# Patient Record
Sex: MALE | Employment: OTHER | URBAN - METROPOLITAN AREA
[De-identification: names, ages, dates, MRNs, and addresses within clinical notes are randomized per-mention and may not be internally consistent; named-entity substitution may affect disease eponyms.]

---

## 2024-07-30 ENCOUNTER — APPOINTMENT (OUTPATIENT)
Dept: NEUROLOGY | Facility: HOSPITAL | Age: 69
End: 2024-07-30
Payer: COMMERCIAL

## 2024-08-20 ENCOUNTER — OFFICE VISIT (OUTPATIENT)
Dept: NEUROLOGY | Facility: HOSPITAL | Age: 69
End: 2024-08-20
Payer: COMMERCIAL

## 2024-08-20 VITALS
WEIGHT: 197.31 LBS | HEART RATE: 74 BPM | SYSTOLIC BLOOD PRESSURE: 126 MMHG | TEMPERATURE: 98 F | HEIGHT: 74 IN | DIASTOLIC BLOOD PRESSURE: 80 MMHG | RESPIRATION RATE: 16 BRPM | BODY MASS INDEX: 25.32 KG/M2

## 2024-08-20 DIAGNOSIS — R41.89 COGNITIVE CHANGES: Primary | ICD-10-CM

## 2024-08-20 PROCEDURE — 1126F AMNT PAIN NOTED NONE PRSNT: CPT | Performed by: PSYCHIATRY & NEUROLOGY

## 2024-08-20 PROCEDURE — 99417 PROLNG OP E/M EACH 15 MIN: CPT | Performed by: PSYCHIATRY & NEUROLOGY

## 2024-08-20 PROCEDURE — 99215 OFFICE O/P EST HI 40 MIN: CPT | Mod: GC | Performed by: PSYCHIATRY & NEUROLOGY

## 2024-08-20 PROCEDURE — 99205 OFFICE O/P NEW HI 60 MIN: CPT | Performed by: PSYCHIATRY & NEUROLOGY

## 2024-08-20 PROCEDURE — 3008F BODY MASS INDEX DOCD: CPT | Performed by: PSYCHIATRY & NEUROLOGY

## 2024-08-20 ASSESSMENT — ENCOUNTER SYMPTOMS
DEPRESSION: 0
OCCASIONAL FEELINGS OF UNSTEADINESS: 0
LOSS OF SENSATION IN FEET: 0

## 2024-08-20 ASSESSMENT — PAIN SCALES - GENERAL: PAINLEVEL: 0-NO PAIN

## 2024-08-20 NOTE — PATIENT INSTRUCTIONS
You were seen today by Dr. Rivers.  It is a pleasure seeing you.    Given the history and today's evaluation, I see some cognitive changes which are mild.  Etiology include medication side effect, incipient neurodegenerative disorder, a multifactorial etiology can't be entirely ruled out.      PLAN:  Given the history of VF and cardiac arrest, I would like to get brain MRI wo contrast  Check vitamin B12   We discussed further evaluation with neuropsychological testing, amyloid PET and CSF testing for Alzheimer's disease biomarker.   We discussed strategies to remain physically and mentally active, and socially engaged.  Follow-up in 6 months or earlier if needed      Please call [phone] if you have any questions.     General brain health guidelines:  - make sure your other medical conditions are well controlled (e.g., high blood pressure, high cholesterol, diabetes, sleep apnea etc)  - do not smoke or chew tobacco  - address any sensory deficits (e.g., proper glasses for poor eyesight, hearing aids for hearing loss)  - use a weekly pill box  - eat a heart healthy diet (e.g., lots of fruits and vegetables, low fat and cholesterol)  - exercise at least four days per week, 30 minutes at a time at an intensity that would make it difficult to converse with someone   - make sure you are getting at least 7 hours of quality sleep per night  - keep yourself mentally active daily by reading, playing cards, doing word searches, puzzles, etc.  - stay socially active by being part of a group or organization

## 2024-08-20 NOTE — PROGRESS NOTES
Provider impressions:  Mr. Lino is a 68 -year-old RH man with 14 years of formal education and PMHx of VF c/b cardiac arrest in 2009, s/p ICD implantation, dilated cardiomyopathy, and asthma who is presenting today with chief complaint of cognitive changes. He is referred by PCP, Dr. Barton for cognitive evaluation.   He is accompanied by his wife, Aida who is healthcare POA. There are cognitive changes involving short term memory over the past year, he is independent in ADLs, neurological examination today is non-focal, he scored 25/30 on MoCA, I discussed the findings with the patient and his wife, recommended brain MRI wo contrast, per patient TSH was recently checked and came back in normal range, I put an order to check for vitamin B12 level.  I also discussed further diagnostic work-up including neuropsychological evaluation, further work-up for underlying causes including amyloid PET scan or CSF testing for Alzheimer's disease biomarker. He would like to think about them first and discuss further work-up at follow-up visit.      Given the history and today's evaluation, I see some cognitive changes which are mild.  Etiology include medication side effect as he has been taking amiodarone, incipient neurodegenerative disorder, a multifactorial etiology can't be entirely ruled out.      PLAN:  Given the history of VF and cardiac arrest, I would like to get brain MRI wo contrast  Check vitamin B12   We discussed further evaluation with neuropsychological testing, amyloid PET and CSF testing for Alzheimer's disease biomarker.   We discussed strategies to remain physically and mentally active, and socially engaged.  Follow-up in 6 months or earlier if needed        History of present illness:  Mr. Lino is a 68 -year-old RH man with 14 years of formal education and PMHx of VF c/b cardiac arrest in 2009, s/p ICD  who is presenting today with chief complaint of cognitive changes. He is referred by PCP,  "Dr. Barton for cognitive evaluation.   He is accompanied by his wife, Aida who is healthcare POA.   Aida has been noticing cognitive changes for about one year. Onset was gradual. has trouble remembering names of things, forgets scheduled events but uses a calendar.  They live in [place] and have a correction home in [facility], he works on the Triage 2 days ago.  Denies any functional impairment.     Mood is \"good.\". has not noticed any significant depressive symptoms. Gets frustrated with memory problems.   No excessive worry or anxiety.     Has good appetite - eats healthy.     No change in personality, social disinhibition, change in dietary preferences, loss of empathy, stereotyped or repetitive behaviors.     No visual hallucinations, fluctuating cognition, tremors, REM sleep behavior disorder, falls. She reports he snores at night.    Functional changes:   Born and raised in [place], [place]  Sleep: goes to bed at 1030 pm and wakes up at 7 am   Current living situation - lives in a one-story house.   Driving - Independent.   Finances - Independent.   Cooking - wife cooks.   ADLS - independent.   Medications - Takes own medication; uses pillbox.   Weapons at home: no  Knows 911? yes      Neuropsychiatric symptoms:   Depression - denies depression. Appetite ok. Sleeping fine. No worthlessness/helplessness. No suicidal thoughts, intent or plans.   Anxiety - Denies.   Psychosis - Denies.   Cherie - Denies.   Suicidality - Denies.     Prior Work-up:   Normal TSH, Vit B12.   Neuropsychological testing    Past Neuropsychiatric History:  Handedness: right.   History of traumatic brain injury: None.   History of seizures: None  History of stroke: None.   Past psychiatric history: None    PMH/PSH:  As above, in addition    Meds: reviewed as listed    Allergies: reviewed as listed      Family history:   Psychiatric: None.   Dementia: None   Parkinsons disease: None  Huntingtons disease: None  ALS: None    Social " "History:   Tobacco use, occasional alcohol, no recreational drugs  . Has  3 children   Worked in sales, retired in 2018      Mental Status Examination:  General appearance: Well-groomed, good eye contact, cooperative  Orientation: Alert and oriented to person, place, and time  Motor: no psychomotor agitation or retardation, stable gait  Speech: regular rate, rhythm, tone, and volume  Mood: \"good\"  Affect: stable, full range, with brightening, and mood congruent  Passive death wish: denies  Suicidal ideation: denies  Thought process: logical, linear, and goal-directed without loosening of associations  Thought content: no hallucinations, delusions, and not responding to internal stimuli  Insight/Judgment: good/good  Praxis: Transitive/Intransitive/Orofacial  Fund of knowledge: good  Recent and remote memory: good  Attention span and concentration: good  Language: normal receptive and expressive language without paraphrasic errors    MoCA- [place] Cognitive Assessment ( 08/20/2024 ) : 25/30   Visuospatial / Executive: 5/5  Naming: 3/3  Read List of Digits: 1/2  Read List of Letters: 1/1  Serial Sevens: 3/3   Language Repeat: 1/2   Language Fluency: 0/1   Abstraction: 2/2   Delayed Recall: 3/5   Orientation: 6/6  Education:  14 years    \"f\"= [9], \"animals\"= [17]  Memory Index Score (MIS): 10/15  No agraphia or alexia  Completed 3-step Luria task  Negative applause sign  Head turning sign: no     NEUROLOGICAL EXAMINATION    Opthalmoscopic:   Normal.  Fundi were well visualized with normal disc margins, clear vessels, and vascular pulsations, No disc edema.  The cup/disk ratio was not enlarged.  No hemorrhages or exudates were present in the posterior segments that were visualized.    Cranial nerves:  CN II: visual fields full to confrontation  CN III, IV, VI: Pupils round, reactive to light and accommodation.  Lids symmetric.  No ptosis.  Extra-occular muscles are intact with normal alignment.  No nystagmus  CN " V: Facial sensation intact bilaterally.    CN VII: Normal and symmetric facial strength.  Nasolabial folds symmetric  CN VIII: Hearing intact to finger rub  CN IX: Palate elevates symmetrically.  CN XI: Normal shoulder shrug and neck turning  CN XII: Tongue midline, with normal bulk and strength, no fasciculations        Motor:  Muscle bulk was normal in all extremities.  5/5 strength in all extremities  Normal finger taps and hand opening  Normal tone  No abnormal movements    Reflexes:   Right UE     LEFT UE  BR: 2          BR: 2  Biceps: 2    Biceps: 2  Triceps: 2   Triceps: 2    RIGHT LE     LEFT LE  Knee: 2        Knee: 2  Ankle: 2       Ankle: 2    Negative Lucrecia's reflex  No frontal release signs    Coordination:  Normal finger to nose testing and rapid alternating movements    Gait:  Able to stand from seated position with arms across chest  Stable gait    Sensory:  Negative Romberg's sign         ROS: All systems reviewed, pertinent positives noted in HPI

## 2024-10-02 DIAGNOSIS — R41.89 COGNITIVE CHANGES: Primary | ICD-10-CM

## 2024-10-23 NOTE — PROGRESS NOTES
Subjective   Patient ID: John Lino is a 69 y.o. male who presents for No chief complaint on file..  HPI  PT PRESENTS FOR A F/U ON HIS PSA AND TO CHECK HIS PROSTATE.  Overall the patient feels well denying any significant new pain and/or discomfort on  Are you experiencing:  Burning on urination -- NO  Pain on urination  -- NO  Urinary frequency -- NO  Urinary urgency -- NO  Urge incontinence --NO  Urinary stress incontinence  -- NO  Number of pads used per day --NONE  Eneuresis -- NO  Nocturia-- 1-2 X ON AVG  Hematuria --NO  Hesitancy -- NO  Post void fullness -- NO    Review of Systems  General-- No C/O fever or chills  Head-- No C/O Dizziness  Eyes-- NO  C/O blurry or double vision  Ears-- No C/O hearing loss  Neck-- Supple  Chest-- No C/O pain or discomfort  Lungs-- PT  C/O shortness of breath  Abdomen-- No C/O  pain or discomfort, No nausea or vomiting  Back-- No C/O back pain or discomfort  Extremities-- No C/O swelling or pain    Objective   Physical Exam    General-- well developed, well nourished in NAD  Head-- normal cephalic, atraumatic  Eyes-- PERRL, EOM'S FROM,  no  jaundice  Neck-- Supple, without masses  Chest-- Normal bony structure  Abdomen-- soft, non tender, liver spleen not palpable . No supra pubic masses  Back-- no flank masses palpable, no CVA tenderness on palpation or perc;ussion  Lymph nodes-- No inguinal lymphadenopathy noted  Prostate-- 2+, firm, smooth, non tender,without nodules  Testis-- both down, non tender, without masses  Epididymis-- no masses palpable  Scrotum -- no hydrocele noted  Extremities -- Normal muscle mass and tone for the patients age  Neurological-- oriented times three    Urinalysis dipstick--within normal limits    Assessment/Plan   A:  NORMAL FEELING PROSTATE  NO FAMILY H/O PROSTATE CANCER  REMOTE H/O AN ELEVATED PSA  WILL NEED TO CHECK  WITH DR MIGUEL RE: THE TYPE OF ANTIBIOTICS TO USE IF A BX IS DONE  P:  OBTAIN ALL OLD RECORDS FROM [facility]   PSA NOW-- PT  TO CALL FOR THE RESULTS  FURTHER REC ERIKA Lopez MD 10/23/24 9:33 AM

## 2024-10-24 ENCOUNTER — APPOINTMENT (OUTPATIENT)
Dept: UROLOGY | Facility: CLINIC | Age: 69
End: 2024-10-24
Payer: COMMERCIAL

## 2024-10-24 VITALS
HEIGHT: 74 IN | BODY MASS INDEX: 25.67 KG/M2 | WEIGHT: 200 LBS | SYSTOLIC BLOOD PRESSURE: 100 MMHG | HEART RATE: 80 BPM | DIASTOLIC BLOOD PRESSURE: 66 MMHG

## 2024-10-24 DIAGNOSIS — N13.8 BPH WITH URINARY OBSTRUCTION: ICD-10-CM

## 2024-10-24 DIAGNOSIS — N40.1 BPH WITH URINARY OBSTRUCTION: ICD-10-CM

## 2024-10-24 DIAGNOSIS — R35.1 NOCTURIA: Primary | ICD-10-CM

## 2024-10-24 LAB
POC APPEARANCE, URINE: CLEAR
POC BILIRUBIN, URINE: NEGATIVE
POC BLOOD, URINE: NEGATIVE
POC COLOR, URINE: YELLOW
POC GLUCOSE, URINE: NEGATIVE MG/DL
POC KETONES, URINE: NEGATIVE MG/DL
POC LEUKOCYTES, URINE: NEGATIVE
POC NITRITE,URINE: NEGATIVE
POC PH, URINE: 6 PH
POC PROTEIN, URINE: NEGATIVE MG/DL
POC SPECIFIC GRAVITY, URINE: >=1.03
POC UROBILINOGEN, URINE: 0.2 EU/DL

## 2024-10-24 PROCEDURE — 81003 URINALYSIS AUTO W/O SCOPE: CPT | Performed by: UROLOGY

## 2024-10-24 PROCEDURE — 99214 OFFICE O/P EST MOD 30 MIN: CPT | Performed by: UROLOGY

## 2024-10-24 PROCEDURE — 1159F MED LIST DOCD IN RCRD: CPT | Performed by: UROLOGY

## 2024-10-24 PROCEDURE — 1036F TOBACCO NON-USER: CPT | Performed by: UROLOGY

## 2024-10-24 PROCEDURE — 1160F RVW MEDS BY RX/DR IN RCRD: CPT | Performed by: UROLOGY

## 2024-10-24 PROCEDURE — 3008F BODY MASS INDEX DOCD: CPT | Performed by: UROLOGY

## 2024-10-24 NOTE — LETTER
October 26, 2024     Vikram Barton MD  [address] [id number]    Patient: Javier Lino   YOB: 1955   Date of Visit: 10/24/2024       Dear Dr. Vikram Barton MD:    Thank you for referring Javier Lino to me for evaluation. Below are my notes for this consultation.  If you have questions, please do not hesitate to call me. I look forward to following your patient along with you.       Sincerely,     Epifanio Lopez MD      CC: No Recipients  ______________________________________________________________________________________    Subjective  Patient ID: John Lino is a 69 y.o. male who presents for No chief complaint on file..  HPI  PT PRESENTS FOR A F/U ON HIS PSA AND TO CHECK HIS PROSTATE.  Overall the patient feels well denying any significant new pain and/or discomfort on  Are you experiencing:  Burning on urination -- NO  Pain on urination  -- NO  Urinary frequency -- NO  Urinary urgency -- NO  Urge incontinence --NO  Urinary stress incontinence  -- NO  Number of pads used per day --NONE  Eneuresis -- NO  Nocturia-- 1-2 X ON AVG  Hematuria --NO  Hesitancy -- NO  Post void fullness -- NO    Review of Systems  General-- No C/O fever or chills  Head-- No C/O Dizziness  Eyes-- NO  C/O blurry or double vision  Ears-- No C/O hearing loss  Neck-- Supple  Chest-- No C/O pain or discomfort  Lungs-- PT  C/O shortness of breath  Abdomen-- No C/O  pain or discomfort, No nausea or vomiting  Back-- No C/O back pain or discomfort  Extremities-- No C/O swelling or pain    Objective   Physical Exam    General-- well developed, well nourished in NAD  Head-- normal cephalic, atraumatic  Eyes-- PERRL, EOM'S FROM,  no  jaundice  Neck-- Supple, without masses  Chest-- Normal bony structure  Abdomen-- soft, non tender, liver spleen not palpable . No supra pubic masses  Back-- no flank masses palpable, no CVA tenderness on palpation or perc;ussion  Lymph nodes-- No inguinal lymphadenopathy  noted  Prostate-- 2+, firm, smooth, non tender,without nodules  Testis-- both down, non tender, without masses  Epididymis-- no masses palpable  Scrotum -- no hydrocele noted  Extremities -- Normal muscle mass and tone for the patients age  Neurological-- oriented times three    Urinalysis dipstick--within normal limits    Assessment/Plan   A:  NORMAL FEELING PROSTATE  NO FAMILY H/O PROSTATE CANCER  REMOTE H/O AN ELEVATED PSA  WILL NEED TO CHECK  WITH DR MGIUEL RE: THE TYPE OF ANTIBIOTICS TO USE IF A BX IS DONE  P:  OBTAIN ALL OLD RECORDS FROM [facility]   PSA NOW-- PT TO CALL FOR THE RESULTS  FURTHER REC ERIKA Lopez MD 10/23/24 9:33 AM

## 2024-10-28 ENCOUNTER — LAB (OUTPATIENT)
Dept: LAB | Facility: LAB | Age: 69
End: 2024-10-28
Payer: COMMERCIAL

## 2024-10-28 DIAGNOSIS — R41.89 COGNITIVE CHANGES: ICD-10-CM

## 2024-10-28 DIAGNOSIS — N40.1 BPH WITH URINARY OBSTRUCTION: ICD-10-CM

## 2024-10-28 DIAGNOSIS — N13.8 BPH WITH URINARY OBSTRUCTION: ICD-10-CM

## 2024-10-28 LAB
PSA SERPL-MCNC: 2.96 NG/ML
VIT B12 SERPL-MCNC: 213 PG/ML (ref 211–911)

## 2024-10-28 PROCEDURE — 36415 COLL VENOUS BLD VENIPUNCTURE: CPT

## 2024-10-28 PROCEDURE — 84153 ASSAY OF PSA TOTAL: CPT

## 2024-10-28 PROCEDURE — 82607 VITAMIN B-12: CPT

## 2024-11-04 ENCOUNTER — TELEPHONE (OUTPATIENT)
Dept: NEUROLOGY | Facility: HOSPITAL | Age: 69
End: 2024-11-04
Payer: COMMERCIAL

## 2024-11-04 ENCOUNTER — APPOINTMENT (OUTPATIENT)
Dept: RADIOLOGY | Facility: HOSPITAL | Age: 69
End: 2024-11-04
Payer: COMMERCIAL

## 2024-11-04 DIAGNOSIS — R41.89 COGNITIVE CHANGES: Primary | ICD-10-CM

## 2024-11-04 RX ORDER — LANOLIN ALCOHOL/MO/W.PET/CERES
1000 CREAM (GRAM) TOPICAL DAILY
Qty: 90 TABLET | Refills: 3 | Status: SHIPPED | OUTPATIENT
Start: 2024-11-04 | End: 2025-11-04

## 2024-11-04 NOTE — TELEPHONE ENCOUNTER
I returned the patient's call, he will get his MRI brain at [facility].  I returned result of vitamin B12 which came back in low normal range and I recommended vitamin B12 supplementation, I sent a prescription for oral vitamin B12 to pharmacy on file.

## 2025-02-25 ENCOUNTER — APPOINTMENT (OUTPATIENT)
Dept: NEUROLOGY | Facility: HOSPITAL | Age: 70
End: 2025-02-25
Payer: COMMERCIAL